# Patient Record
Sex: MALE | Race: WHITE | NOT HISPANIC OR LATINO | Employment: FULL TIME | URBAN - METROPOLITAN AREA
[De-identification: names, ages, dates, MRNs, and addresses within clinical notes are randomized per-mention and may not be internally consistent; named-entity substitution may affect disease eponyms.]

---

## 2018-06-30 ENCOUNTER — APPOINTMENT (EMERGENCY)
Dept: CT IMAGING | Facility: HOSPITAL | Age: 33
End: 2018-06-30
Payer: COMMERCIAL

## 2018-06-30 ENCOUNTER — HOSPITAL ENCOUNTER (EMERGENCY)
Facility: HOSPITAL | Age: 33
Discharge: HOME | End: 2018-06-30
Attending: EMERGENCY MEDICINE
Payer: COMMERCIAL

## 2018-06-30 VITALS
OXYGEN SATURATION: 100 % | WEIGHT: 184 LBS | TEMPERATURE: 98.9 F | SYSTOLIC BLOOD PRESSURE: 115 MMHG | RESPIRATION RATE: 16 BRPM | HEART RATE: 83 BPM | DIASTOLIC BLOOD PRESSURE: 51 MMHG

## 2018-06-30 DIAGNOSIS — R10.9 ABDOMINAL PAIN: Primary | ICD-10-CM

## 2018-06-30 DIAGNOSIS — R11.0 NAUSEA: ICD-10-CM

## 2018-06-30 LAB
ABO GROUP BLD: NORMAL
ALBUMIN SERPL BCP-MCNC: 4.6 G/DL (ref 3.5–5)
ALP SERPL-CCNC: 53 U/L (ref 46–116)
ALT SERPL W P-5'-P-CCNC: 37 U/L (ref 12–78)
ANION GAP SERPL CALCULATED.3IONS-SCNC: 11 MMOL/L (ref 4–13)
AST SERPL W P-5'-P-CCNC: 36 U/L (ref 5–45)
BASOPHILS # BLD AUTO: 0.02 THOUSANDS/ΜL (ref 0–0.1)
BASOPHILS NFR BLD AUTO: 0 % (ref 0–1)
BILIRUB SERPL-MCNC: 0.56 MG/DL (ref 0.2–1)
BLD GP AB SCN SERPL QL: NEGATIVE
BUN SERPL-MCNC: 19 MG/DL (ref 5–25)
CALCIUM SERPL-MCNC: 9 MG/DL (ref 8.3–10.1)
CHLORIDE SERPL-SCNC: 103 MMOL/L (ref 100–108)
CO2 SERPL-SCNC: 27 MMOL/L (ref 21–32)
CREAT SERPL-MCNC: 1.48 MG/DL (ref 0.6–1.3)
EOSINOPHIL # BLD AUTO: 0.03 THOUSAND/ΜL (ref 0–0.61)
EOSINOPHIL NFR BLD AUTO: 0 % (ref 0–6)
ERYTHROCYTE [DISTWIDTH] IN BLOOD BY AUTOMATED COUNT: 12.6 % (ref 11.6–15.1)
GFR SERPL CREATININE-BSD FRML MDRD: 61 ML/MIN/1.73SQ M
GLUCOSE SERPL-MCNC: 103 MG/DL (ref 65–140)
GLUCOSE SERPL-MCNC: 95 MG/DL (ref 65–140)
HCT VFR BLD AUTO: 46.2 % (ref 36.5–49.3)
HGB BLD-MCNC: 16.7 G/DL (ref 12–17)
LIPASE SERPL-CCNC: 144 U/L (ref 73–393)
LYMPHOCYTES # BLD AUTO: 1.65 THOUSANDS/ΜL (ref 0.6–4.47)
LYMPHOCYTES NFR BLD AUTO: 16 % (ref 14–44)
MCH RBC QN AUTO: 32.7 PG (ref 26.8–34.3)
MCHC RBC AUTO-ENTMCNC: 36.1 G/DL (ref 31.4–37.4)
MCV RBC AUTO: 91 FL (ref 82–98)
MONOCYTES # BLD AUTO: 0.73 THOUSAND/ΜL (ref 0.17–1.22)
MONOCYTES NFR BLD AUTO: 7 % (ref 4–12)
NEUTROPHILS # BLD AUTO: 7.93 THOUSANDS/ΜL (ref 1.85–7.62)
NEUTS SEG NFR BLD AUTO: 77 % (ref 43–75)
NRBC BLD AUTO-RTO: 0 /100 WBCS
PLATELET # BLD AUTO: 203 THOUSANDS/UL (ref 149–390)
PMV BLD AUTO: 11.1 FL (ref 8.9–12.7)
POTASSIUM SERPL-SCNC: 4 MMOL/L (ref 3.5–5.3)
PROT SERPL-MCNC: 7.5 G/DL (ref 6.4–8.2)
RBC # BLD AUTO: 5.1 MILLION/UL (ref 3.88–5.62)
RH BLD: POSITIVE
SODIUM SERPL-SCNC: 141 MMOL/L (ref 136–145)
SPECIMEN EXPIRATION DATE: NORMAL
WBC # BLD AUTO: 10.36 THOUSAND/UL (ref 4.31–10.16)

## 2018-06-30 PROCEDURE — 82948 REAGENT STRIP/BLOOD GLUCOSE: CPT

## 2018-06-30 PROCEDURE — 86901 BLOOD TYPING SEROLOGIC RH(D): CPT | Performed by: EMERGENCY MEDICINE

## 2018-06-30 PROCEDURE — 83690 ASSAY OF LIPASE: CPT | Performed by: EMERGENCY MEDICINE

## 2018-06-30 PROCEDURE — 96375 TX/PRO/DX INJ NEW DRUG ADDON: CPT

## 2018-06-30 PROCEDURE — 96374 THER/PROPH/DIAG INJ IV PUSH: CPT

## 2018-06-30 PROCEDURE — 74177 CT ABD & PELVIS W/CONTRAST: CPT

## 2018-06-30 PROCEDURE — 86850 RBC ANTIBODY SCREEN: CPT | Performed by: EMERGENCY MEDICINE

## 2018-06-30 PROCEDURE — 96361 HYDRATE IV INFUSION ADD-ON: CPT

## 2018-06-30 PROCEDURE — 86900 BLOOD TYPING SEROLOGIC ABO: CPT | Performed by: EMERGENCY MEDICINE

## 2018-06-30 PROCEDURE — 80053 COMPREHEN METABOLIC PANEL: CPT | Performed by: EMERGENCY MEDICINE

## 2018-06-30 PROCEDURE — 36415 COLL VENOUS BLD VENIPUNCTURE: CPT | Performed by: EMERGENCY MEDICINE

## 2018-06-30 PROCEDURE — 99284 EMERGENCY DEPT VISIT MOD MDM: CPT

## 2018-06-30 PROCEDURE — 85025 COMPLETE CBC W/AUTO DIFF WBC: CPT | Performed by: EMERGENCY MEDICINE

## 2018-06-30 RX ORDER — KETOROLAC TROMETHAMINE 30 MG/ML
15 INJECTION, SOLUTION INTRAMUSCULAR; INTRAVENOUS ONCE
Status: COMPLETED | OUTPATIENT
Start: 2018-06-30 | End: 2018-06-30

## 2018-06-30 RX ORDER — ONDANSETRON 2 MG/ML
4 INJECTION INTRAMUSCULAR; INTRAVENOUS ONCE
Status: COMPLETED | OUTPATIENT
Start: 2018-06-30 | End: 2018-06-30

## 2018-06-30 RX ORDER — METOCLOPRAMIDE 10 MG/1
10 TABLET ORAL EVERY 6 HOURS
Qty: 10 TABLET | Refills: 0 | Status: SHIPPED | OUTPATIENT
Start: 2018-06-30 | End: 2019-07-12 | Stop reason: ALTCHOICE

## 2018-06-30 RX ORDER — NAPROXEN 500 MG/1
500 TABLET ORAL 2 TIMES DAILY WITH MEALS
Qty: 10 TABLET | Refills: 0 | Status: SHIPPED | OUTPATIENT
Start: 2018-06-30 | End: 2019-07-12 | Stop reason: ALTCHOICE

## 2018-06-30 RX ADMIN — SODIUM CHLORIDE 1000 ML: 0.9 INJECTION, SOLUTION INTRAVENOUS at 17:50

## 2018-06-30 RX ADMIN — IOHEXOL 100 ML: 350 INJECTION, SOLUTION INTRAVENOUS at 18:35

## 2018-06-30 RX ADMIN — ONDANSETRON 4 MG: 2 INJECTION INTRAMUSCULAR; INTRAVENOUS at 17:49

## 2018-06-30 RX ADMIN — KETOROLAC TROMETHAMINE 15 MG: 30 INJECTION, SOLUTION INTRAMUSCULAR at 17:48

## 2018-06-30 NOTE — DISCHARGE INSTRUCTIONS

## 2018-06-30 NOTE — ED PROVIDER NOTES
History  Chief Complaint   Patient presents with    Abdominal Pain     Reports lower abd pain that started after jumping into a pool approx  30 min ago  Reports nausea  Reports worse with movement  Pt reports he ran a [employer] Race a few days ago and felt a soreness in his abdomen, "orestes like when you've done an ab workout "  That went away  Today, he dove into a pool and immediatly felt pain in his RLQ  Radiates across abdomen  He dove in again and felt the same pain  History provided by:  Patient   used: No    Abdominal Pain   Pain location:  RLQ  Pain quality: sharp    Pain radiation: Across abdomen  Duration:  2 hours  Timing:  Constant  Progression:  Waxing and waning  Chronicity:  New  Associated symptoms: nausea    Associated symptoms: no chills, no constipation, no cough, no diarrhea, no dysuria, no fever, no hematuria, no sore throat and no vomiting    Risk factors: has not had multiple surgeries        None       History reviewed  No pertinent past medical history  Past Surgical History:   Procedure Laterality Date    SHOULDER SURGERY Right        History reviewed  No pertinent family history  I have reviewed and agree with the history as documented  Social History   Substance Use Topics    Smoking status: Never Smoker    Smokeless tobacco: Never Used    Alcohol use Yes      Comment: social        Review of Systems   Constitutional: Negative for chills and fever  HENT: Negative for rhinorrhea and sore throat  Respiratory: Negative for cough  Gastrointestinal: Positive for abdominal pain and nausea  Negative for abdominal distention, anal bleeding, blood in stool, constipation, diarrhea, rectal pain and vomiting  Genitourinary: Negative for dysuria, flank pain, frequency, hematuria and urgency  Musculoskeletal: Negative for back pain  Skin: Negative for pallor and rash  All other systems reviewed and are negative        Physical Exam  Physical Exam   Constitutional: He is oriented to person, place, and time  He appears well-developed and well-nourished  No distress  HENT:   Head: Normocephalic  Mouth/Throat: Oropharynx is clear and moist and mucous membranes are normal    Neck: Normal range of motion  Neck supple  Cardiovascular: Normal rate, regular rhythm, normal heart sounds and intact distal pulses  Exam reveals no gallop and no friction rub  No murmur heard  Pulmonary/Chest: Effort normal and breath sounds normal  No respiratory distress  He has no wheezes  He has no rales  Abdominal: Soft  Normal appearance and bowel sounds are normal  He exhibits no distension and no mass  There is no hepatosplenomegaly  There is tenderness in the right lower quadrant  There is tenderness at McBurney's point  There is no rigidity, no rebound, no guarding, no CVA tenderness and negative Choudhary's sign  Lymphadenopathy:     He has no cervical adenopathy  Neurological: He is alert and oriented to person, place, and time  Skin: Skin is warm, dry and intact  No rash noted  No pallor  Nursing note and vitals reviewed        Vital Signs  ED Triage Vitals   Temperature Pulse Respirations Blood Pressure SpO2   06/30/18 1651 06/30/18 1651 06/30/18 1651 06/30/18 1651 06/30/18 1651   98 9 °F (37 2 °C) 100 18 138/80 98 %      Temp Source Heart Rate Source Patient Position - Orthostatic VS BP Location FiO2 (%)   06/30/18 1651 06/30/18 1745 06/30/18 1745 06/30/18 1745 --   Temporal Monitor Lying Right arm       Pain Score       06/30/18 1651       Worst Possible Pain           Vitals:    06/30/18 1651 06/30/18 1745 06/30/18 1857   BP: 138/80 167/77 115/51   Pulse: 100 83 83   Patient Position - Orthostatic VS:  Lying Sitting       Visual Acuity      ED Medications  Medications   sodium chloride 0 9 % bolus 1,000 mL (0 mL Intravenous Stopped 6/30/18 1847)   ondansetron (ZOFRAN) injection 4 mg (4 mg Intravenous Given 6/30/18 1749)   ketorolac (TORADOL) injection 15 mg (15 mg Intravenous Given 6/30/18 1748)   iohexol (OMNIPAQUE) 350 MG/ML injection (MULTI-DOSE) 100 mL (100 mL Intravenous Given 6/30/18 1835)       Diagnostic Studies  Results Reviewed     Procedure Component Value Units Date/Time    Comprehensive metabolic panel [[id number]]  (Abnormal) Collected:  06/30/18 1747    Lab Status:  Final result Specimen:  Blood from Arm, Right Updated:  06/30/18 1816     Sodium 141 mmol/L      Potassium 4 0 mmol/L      Chloride 103 mmol/L      CO2 27 mmol/L      Anion Gap 11 mmol/L      BUN 19 mg/dL      Creatinine 1 48 (H) mg/dL      Glucose 95 mg/dL      Calcium 9 0 mg/dL      AST 36 U/L      ALT 37 U/L      Alkaline Phosphatase 53 U/L      Total Protein 7 5 g/dL      Albumin 4 6 g/dL      Total Bilirubin 0 56 mg/dL      eGFR 61 ml/min/1 73sq m     Narrative:         National Kidney Disease Education Program recommendations are as follows:  GFR calculation is accurate only with a steady state creatinine  Chronic Kidney disease less than 60 ml/min/1 73 sq  meters  Kidney failure less than 15 ml/min/1 73 sq  meters      Lipase [[id number]]  (Normal) Collected:  06/30/18 1747    Lab Status:  Final result Specimen:  Blood from Arm, Right Updated:  06/30/18 1816     Lipase 144 u/L     CBC and differential [[id number]]  (Abnormal) Collected:  06/30/18 1747    Lab Status:  Final result Specimen:  Blood from Arm, Left Updated:  06/30/18 1808     WBC 10 36 (H) Thousand/uL      RBC 5 10 Million/uL      Hemoglobin 16 7 g/dL      Hematocrit 46 2 %      MCV 91 fL      MCH 32 7 pg      MCHC 36 1 g/dL      RDW 12 6 %      MPV 11 1 fL      Platelets 241 Thousands/uL      nRBC 0 /100 WBCs      Neutrophils Relative 77 (H) %      Lymphocytes Relative 16 %      Monocytes Relative 7 %      Eosinophils Relative 0 %      Basophils Relative 0 %      Neutrophils Absolute 7 93 (H) Thousands/µL      Lymphocytes Absolute 1 65 Thousands/µL      Monocytes Absolute 0 73 Thousand/µL      Eosinophils Absolute 0 03 Thousand/µL      Basophils Absolute 0 02 Thousands/µL     Fingerstick Glucose (POCT) [[id number]]  (Normal) Collected:  06/30/18 1751    Lab Status:  Final result Updated:  06/30/18 1755     POC Glucose 103 mg/dl                  CT abdomen pelvis with contrast   Final Result by Lisseth Frederick MD (06/30 1853)         1  Normal appendix   2  No acute traumatic injury detected            Workstation performed: [id number]                    Procedures  Procedures       Phone Contacts  ED Phone Contact    ED Course  ED Course as of Jun 30 2016   Sat Jun 30, 2018   1747 I heard the nurse screaming for me from pt's room  Pt is unresponsive in the room, but has good pulses  He is diaphoretic  Pt suddenly "snapped out of it" and says "hello," is alert and oriented  Nurse now tells me that she was drawing his blood when this happened  Likely pt had vasovagal event  Will check accucheck  1751 POC Glucose: 103   1912 Pt updated on labs/results  MDM  Number of Diagnoses or Management Options  Diagnosis management comments: RLQ pain - Will check CBC as marker of infection, CMP to r/o hepatitis/biliary disease, lipase to r/o pancreatitis, CT A/P to r/o appendicitis/blunt trauma  Pt states he does not want narcotic pain meds         Amount and/or Complexity of Data Reviewed  Clinical lab tests: ordered and reviewed  Tests in the radiology section of CPT®: ordered and reviewed  Tests in the medicine section of CPT®: reviewed and ordered      CritCare Time    Disposition  Final diagnoses:   Abdominal pain   Nausea     Time reflects when diagnosis was documented in both MDM as applicable and the Disposition within this note     Time User Action Codes Description Comment    6/30/2018  7:13 PM Everton Powell 47 [R10 9] Abdominal pain     6/30/2018  7:13 PM Everton Powell 47 [R11 0] Nausea       ED Disposition     ED Disposition Condition Comment    Discharge  Seth Harris discharge to home/self care  Condition at discharge: Good        Follow-up Information    None         Discharge Medication List as of 6/30/2018  7:14 PM      START taking these medications    Details   metoclopramide (REGLAN) 10 mg tablet Take 1 tablet (10 mg total) by mouth every 6 (six) hours, Starting Sat 6/30/2018, Print      naproxen (NAPROSYN) 500 mg tablet Take 1 tablet (500 mg total) by mouth 2 (two) times a day with meals, Starting Sat 6/30/2018, Print           No discharge procedures on file      ED Provider  Electronically Signed by           Chantell Aguirre 23, DO  06/30/18 2017

## 2018-06-30 NOTE — ED NOTES
Pt A&Ox4 states pain has gotten better, c/o feeling hungry states has not eaten much today        Vonda Ponce RN  06/30/18 1929

## 2018-06-30 NOTE — ED NOTES
While placing IV pt became diaphoretic and unresponsive for about 5-6 seconds Dr Alona Burrows made aware and is at bedside        Rodrigo Campoverde RN  06/30/18 9623

## 2019-07-12 ENCOUNTER — HOSPITAL ENCOUNTER (EMERGENCY)
Facility: HOSPITAL | Age: 34
Discharge: HOME | End: 2019-07-12
Attending: EMERGENCY MEDICINE | Admitting: EMERGENCY MEDICINE
Payer: COMMERCIAL

## 2019-07-12 VITALS
TEMPERATURE: 98.4 F | RESPIRATION RATE: 16 BRPM | WEIGHT: 202.82 LBS | HEIGHT: 72 IN | BODY MASS INDEX: 27.47 KG/M2 | OXYGEN SATURATION: 98 % | HEART RATE: 81 BPM | SYSTOLIC BLOOD PRESSURE: 131 MMHG | DIASTOLIC BLOOD PRESSURE: 89 MMHG

## 2019-07-12 DIAGNOSIS — R55 SYNCOPE: Primary | ICD-10-CM

## 2019-07-12 LAB
ANION GAP BLD CALC-SCNC: 19 MMOL/L (ref 4–13)
BUN BLD-MCNC: 23 MG/DL (ref 5–25)
CA-I BLD-SCNC: 1.15 MMOL/L (ref 1.12–1.32)
CHLORIDE BLD-SCNC: 102 MMOL/L (ref 100–108)
CREAT BLD-MCNC: 1.4 MG/DL (ref 0.6–1.3)
GFR SERPL CREATININE-BSD FRML MDRD: 65 ML/MIN/1.73SQ M
GLUCOSE SERPL-MCNC: 110 MG/DL (ref 65–140)
HCT VFR BLD CALC: 46 % (ref 36.5–49.3)
HGB BLDA-MCNC: 15.6 G/DL (ref 12–17)
PCO2 BLD: 27 MMOL/L (ref 21–32)
POTASSIUM BLD-SCNC: 4 MMOL/L (ref 3.5–5.3)
SODIUM BLD-SCNC: 143 MMOL/L (ref 136–145)
SPECIMEN SOURCE: ABNORMAL

## 2019-07-12 PROCEDURE — 85014 HEMATOCRIT: CPT

## 2019-07-12 PROCEDURE — 99284 EMERGENCY DEPT VISIT MOD MDM: CPT

## 2019-07-12 PROCEDURE — 93005 ELECTROCARDIOGRAM TRACING: CPT

## 2019-07-12 PROCEDURE — 99283 EMERGENCY DEPT VISIT LOW MDM: CPT | Performed by: EMERGENCY MEDICINE

## 2019-07-12 PROCEDURE — 80047 BASIC METABLC PNL IONIZED CA: CPT

## 2019-07-13 LAB
ATRIAL RATE: 85 BPM
P AXIS: 69 DEGREES
PR INTERVAL: 132 MS
QRS AXIS: 80 DEGREES
QRSD INTERVAL: 104 MS
QT INTERVAL: 362 MS
QTC INTERVAL: 430 MS
T WAVE AXIS: 13 DEGREES
VENTRICULAR RATE: 85 BPM

## 2019-07-13 PROCEDURE — 93010 ELECTROCARDIOGRAM REPORT: CPT | Performed by: INTERNAL MEDICINE

## 2019-07-13 NOTE — ED PROCEDURE NOTE
PROCEDURE  ECG 12 Lead Documentation Only  Date/Time: 7/12/2019 10:03 PM  Performed by: Vinayak Hardy DO  Authorized by: Vinayak Hardy DO     Indications / Diagnosis:  Syncope  ECG reviewed by me, the ED Provider: yes    Patient location:  ED  Interpretation:     Interpretation: normal    Rate:     ECG rate assessment: normal    Rhythm:     Rhythm: sinus rhythm    Ectopy:     Ectopy: none    QRS:     QRS axis:  Normal  Conduction:     Conduction: normal    ST segments:     ST segments:  Normal  T waves:     T waves: normal           Vinayak Hardy DO  07/12/19 2207

## 2019-07-13 NOTE — ED PROVIDER NOTES
History  Chief Complaint   Patient presents with    Syncope     pt states he was at a restaurant, had a feeling of CP/large burp, and then had a syncopal event during which he was unconscious for about 2 minutes  Pt regained consciousness independently and refused transport by EMS  Pt denies pain currently      25-year-old male with no past medical history presents to the emergency department for evaluation of syncopal event  Patient states he was checking out the course for a [employer] race and did 1 obstacle  He states after the obstacle he felt perfectly normal   He went upstairs to have dinner  He states he had 2 beers with dinner  He felt like he was going to [employer] and had intense pain across his entire chest   He states the next thing he remembers or people around him  He thinks he might have been out for about 2 minutes  No loss of bowel or bladder  No prior history of syncopal episodes  He currently feels completely back to normal   His only complaint is pain across the lateral aspect of his left foot  History provided by:  Patient   used: No    Syncope   Episode history:  Single  Most recent episode: Today  Duration:  2 minutes  Progression:  Resolved  Chronicity:  New  Context: sitting down    Witnessed: yes    Associated symptoms: no anxiety, no chest pain, no confusion, no diaphoresis, no difficulty breathing, no dizziness, no fever, no focal sensory loss, no focal weakness, no headaches, no malaise/fatigue, no nausea, no palpitations, no recent fall, no recent injury, no recent surgery, no rectal bleeding, no seizures, no shortness of breath, no visual change, no vomiting and no weakness    Risk factors: no congenital heart disease, no coronary artery disease, no seizures and no vascular disease        None       History reviewed  No pertinent past medical history      Past Surgical History:   Procedure Laterality Date    GANGLION CYST EXCISION      SHOULDER SURGERY Right        History reviewed  No pertinent family history  I have reviewed and agree with the history as documented  Social History     Tobacco Use    Smoking status: Never Smoker    Smokeless tobacco: Never Used   Substance Use Topics    Alcohol use: Yes     Comment: social   had 2 beers 7/12/19    Drug use: No        Review of Systems   Constitutional: Negative  Negative for diaphoresis, fever and malaise/fatigue  HENT: Negative  Eyes: Negative  Respiratory: Negative  Negative for shortness of breath  Cardiovascular: Positive for syncope  Negative for chest pain and palpitations  Gastrointestinal: Negative  Negative for nausea and vomiting  Genitourinary: Negative  Musculoskeletal: Negative for neck pain  Skin: Negative  Allergic/Immunologic: Negative  Neurological: Negative for dizziness, focal weakness, seizures, weakness, numbness and headaches  Hematological: Negative  Psychiatric/Behavioral: Negative  Negative for confusion  All other systems reviewed and are negative  Physical Exam  Physical Exam   Constitutional: He is oriented to person, place, and time  He appears well-developed and well-nourished  Non-toxic appearance  He does not have a sickly appearance  He does not appear ill  No distress  HENT:   Head: Normocephalic and atraumatic  Right Ear: External ear normal    Left Ear: External ear normal    Mouth/Throat: Oropharynx is clear and moist    Eyes: Pupils are equal, round, and reactive to light  Conjunctivae and EOM are normal  No scleral icterus  Neck: Normal range of motion  Neck supple  No spinous process tenderness present  Cardiovascular: Normal rate, regular rhythm and normal heart sounds  Pulmonary/Chest: Effort normal and breath sounds normal    Abdominal: Soft  Bowel sounds are normal  He exhibits no distension and no mass  There is no tenderness  No hernia  Musculoskeletal: Normal range of motion   He exhibits no edema or deformity  Left foot: There is tenderness  There is normal range of motion, no swelling, no crepitus, no deformity and no laceration  Feet:    Lymphadenopathy:     He has no cervical adenopathy  Neurological: He is alert and oriented to person, place, and time  He has normal strength and normal reflexes  He displays normal reflexes  He exhibits normal muscle tone  Skin: Skin is warm and dry  No rash noted  He is not diaphoretic  No erythema  No pallor  Psychiatric: He has a normal mood and affect  Nursing note and vitals reviewed        Vital Signs  ED Triage Vitals [07/12/19 2136]   Temperature Pulse Respirations Blood Pressure SpO2   98 4 °F (36 9 °C) 81 16 131/89 98 %      Temp Source Heart Rate Source Patient Position - Orthostatic VS BP Location FiO2 (%)   Temporal Monitor Sitting Right arm --      Pain Score       No Pain           Vitals:    07/12/19 2136   BP: 131/89   Pulse: 81   Patient Position - Orthostatic VS: Sitting         Visual Acuity      ED Medications  Medications - No data to display    Diagnostic Studies  Results Reviewed     Procedure Component Value Units Date/Time    POCT Chem 8+ [[id number]]  (Abnormal) Collected:  07/12/19 2204    Lab Status:  Final result Specimen:  Venous Updated:  07/12/19 2208     SODIUM, I-STAT 143 mmol/l      Potassium, i-STAT 4 0 mmol/L      Chloride, istat 102 mmol/L      CO2, i-STAT 27 mmol/L      Anion Gap, i-STAT 19 mmol/L      Calcium, Ionized i-STAT 1 15 mmol/L      BUN, I-STAT 23 mg/dl      Creatinine, i-STAT 1 4 mg/dl      eGFR 65 ml/min/1 73sq m      Glucose, i-STAT 110 mg/dl      Hct, i-STAT 46 %      Hgb, i-STAT 15 6 g/dl      Specimen Type VENOUS    Narrative:       National Kidney Disease Foundation guidelines for Chronic Kidney Disease (CKD):     Stage 1 with normal or high GFR (GFR > 90 mL/min/1 73 square meters)    Stage 2 Mild CKD (GFR = 60-89 mL/min/1 73 square meters)    Stage 3A Moderate CKD (GFR = 45-59 mL/min/1 73 square meters)    Stage 3B Moderate CKD (GFR = 30-44 mL/min/1 73 square meters)    Stage 4 Severe CKD (GFR = 15-29 mL/min/1 73 square meters)    Stage 5 End Stage CKD (GFR <15 mL/min/1 73 square meters)  Note: GFR calculation is accurate only with a steady state creatinine                 No orders to display              Procedures  Procedures       ED Course                               MDM  Number of Diagnoses or Management Options  Diagnosis management comments: 71-year-old male presents for evaluation after syncopal event  Patient states he was eating dinner and felt a belch coming on  This caused intense pain across his chest and then the next thing he remembers is waking up on the floor with people around him  He now is currently completely asymptomatic except for some mild pain over the lateral aspect of his left foot  His exam here is normal   The exam of his left foot is also normal and I do not feel there is any fracture  Will do EKG and check basic chemistry to rule out electrolyte abnormality  At this time I feel patient most likely had a episode of vasovagal syncope secondary to pain       Amount and/or Complexity of Data Reviewed  Clinical lab tests: ordered and reviewed  Independent visualization of images, tracings, or specimens: yes        Disposition  Final diagnoses:   Syncope     Time reflects when diagnosis was documented in both MDM as applicable and the Disposition within this note     Time User Action Codes Description Comment    7/12/2019 10:37 PM Lisa Sotelo Add [R55] Syncope       ED Disposition     ED Disposition Condition Date/Time Comment    Discharge Good Fri Jul 12, 2019 10:37 PM Karla Shin discharge to home/self care              Follow-up Information     Follow up With Specialties Details Why Jose G Jay MD Family Medicine Schedule an appointment as soon as possible for a visit in 3 days  [place] [id number]  [phone]            Patient's Medications   Discharge Prescriptions    No medications on file     No discharge procedures on file      ED Provider  Electronically Signed by           Jenelle Barahona DO  07/12/19 6997

## 2024-12-10 NOTE — PROGRESS NOTES
12/11/2024      Chief Complaint   Patient presents with    Consult     Vasectomy        Assessment and Plan    38 y.o. male managed by new patient    1. Desire for elective sterilization  - exam today as below  - informed consent signed today  - continue/ensure continued contraception until sterilization confirmed below  - rx xanax 1mg with  to/from on appt date  - shave all penile/scrotal/pubic hair day prior to appt date  - need for post-vasectomy semen analysis at 8 weeks after procedure to confirm sterility    Return for vasectomy in office.      History of Present Illness  Aime Caceres is a 38 y.o. male here for evaluation of VASECTOMY CONSULT    History of genitourinary or groin trauma or surgery- no  Fathered children- 1  Current contraceptive method- female birth control  Work/manual labor/lifting-    Voiding issues- none  Bleeding issues/thinners- none  Allergies to lidocaine/marcaine/betadine/chromic- none    The patient presents requesting elective sterilization vasectomy.     We discussed that vasectomy is in operation performed in the office in order to provide elective sterilization. There are instances in which body habitus or changes to the genital tissue/anatomy would necessitate procedure done in a surgical suite/hospital operating room. Physical exam is performed today to assess the candidate specifically for this reason.    This procedure should be considered a permanent option. Although there are subspecialists who perform vasectomy reversals, these operations are not 100% successful and are often not covered by insurance meaning they can come with a large out-of-pocket cost. The patient understands this.     We reviewed the procedure in depth. Risk and benefits of the procedure were discussed and reviewed. Informed consent was obtained in the office today. The patient was prescribed a benzodiazepine to take one hour prior to the procedure to assist with his comfort.  He  understands that he will require transportation by a sober  to and from the office that day if he is to use the benzodiazepine.       He also understands he will require semen analysis testing at 8 weeks post procedure to ensure full sterilization.  In the interim, he will require contraception during intercourse to avoid an undesired pregnancy.     Usually, patients are out of work for 2-3 days. We recommend tight fitting scrotal support following the procedure along with ice packs applied to the scrotum 15 minutes on and 15 minutes off for the first 24-48 hours. We also discussed pain management after the procedure, which will be left up to the discretion of the surgeon who is performing the vasectomy that day.     After this discussion, the patient agrees to proceed. We will schedule him in the near future.    He agrees to take oral sedative - xanax 1mg one hour prior to procedure.      Review of Systems   Constitutional:  Negative for chills, diaphoresis, fatigue and fever.   Respiratory:  Negative for cough and shortness of breath.    Gastrointestinal:  Negative for abdominal pain, diarrhea, nausea and vomiting.   Genitourinary:  Negative for decreased urine volume, difficulty urinating, dysuria, flank pain, frequency, hematuria and urgency.   Musculoskeletal:  Negative for back pain and myalgias.   Skin:  Negative for pallor and wound.   Neurological:  Negative for dizziness, weakness, light-headedness and numbness.       AUA SYMPTOM SCORE      Flowsheet Row Most Recent Value   AUA SYMPTOM SCORE    How often have you had a sensation of not emptying your bladder completely after you finished urinating? 0 (P)     How often have you had to urinate again less than two hours after you finished urinating? 1 (P)     How often have you found you stopped and started again several times when you urinate? 1 (P)     How often have you found it difficult to postpone urination? 0 (P)     How often have you had a weak  "urinary stream? 0 (P)     How often have you had to push or strain to begin urination? 1 (P)     How many times did you most typically get up to urinate from the time you went to bed at night until the time you got up in the morning? 1 (P)     Quality of Life: If you were to spend the rest of your life with your urinary condition just the way it is now, how would you feel about that? 2 (P)     AUA SYMPTOM SCORE 4 (P)             Vitals  Vitals:    12/11/24 0739   BP: 118/76   BP Location: Left arm   Patient Position: Sitting   Cuff Size: Standard   Pulse: 88   SpO2: 96%   Weight: 102 kg (225 lb)   Height: 5' 11\" (1.803 m)       Physical Exam  Constitutional:       Appearance: Normal appearance.   HENT:      Head: Normocephalic and atraumatic.   Eyes:      Conjunctiva/sclera: Conjunctivae normal.   Pulmonary:      Effort: Pulmonary effort is normal.   Genitourinary:     Comments: Circumcised penis, normal phallus, orthotopic patent meatus. Testes smooth, descended bilaterally into the scrotum, nontender with no palpable mass. Palpably normal spermatic cord and vas deferens bilaterally.    Musculoskeletal:         General: Normal range of motion.      Cervical back: Normal range of motion.   Skin:     General: Skin is warm and dry.   Neurological:      General: No focal deficit present.      Mental Status: He is alert and oriented to person, place, and time.   Psychiatric:         Mood and Affect: Mood normal.         Behavior: Behavior normal.         Thought Content: Thought content normal.         Judgment: Judgment normal.       Past History  History reviewed. No pertinent past medical history.  Social History     Socioeconomic History    Marital status: /Civil Union     Spouse name: None    Number of children: None    Years of education: None    Highest education level: None   Occupational History    None   Tobacco Use    Smoking status: Never    Smokeless tobacco: Never   Vaping Use    Vaping status: " Never Used   Substance and Sexual Activity    Alcohol use: Yes     Comment: Social    Drug use: No    Sexual activity: None   Other Topics Concern    None   Social History Narrative    None     Social Drivers of Health     Financial Resource Strain: Not on file   Food Insecurity: Not on file   Transportation Needs: Not on file   Physical Activity: Not on file   Stress: Not on file   Social Connections: Not on file   Intimate Partner Violence: Not on file   Housing Stability: Not on file     Social History     Tobacco Use   Smoking Status Never   Smokeless Tobacco Never     Family History   Problem Relation Age of Onset    Heart disease Paternal Grandmother     Heart disease Paternal Grandfather     Atrial fibrillation Maternal Grandmother        The following portions of the patient's history were reviewed and updated as appropriate: allergies, current medications, past medical history, past social history, past surgical history and problem list.    Results  Lab Results   Component Value Date    GLUCOSE 110 07/12/2019    CALCIUM 9.2 10/25/2024    K 4.1 10/25/2024    CO2 30 10/25/2024     10/25/2024    BUN 18 10/25/2024    CREATININE 1.19 10/25/2024     Lab Results   Component Value Date    WBC 10.36 (H) 06/30/2018    HGB 15.6 07/12/2019    HCT 46 07/12/2019    MCV 91 06/30/2018     06/30/2018

## 2024-12-11 ENCOUNTER — OFFICE VISIT (OUTPATIENT)
Dept: UROLOGY | Facility: MEDICAL CENTER | Age: 39
End: 2024-12-11
Payer: COMMERCIAL

## 2024-12-11 VITALS
OXYGEN SATURATION: 96 % | HEART RATE: 88 BPM | BODY MASS INDEX: 31.5 KG/M2 | HEIGHT: 71 IN | WEIGHT: 225 LBS | DIASTOLIC BLOOD PRESSURE: 76 MMHG | SYSTOLIC BLOOD PRESSURE: 118 MMHG

## 2024-12-11 DIAGNOSIS — Z30.09 CONSULTATION FOR STERILIZATION: Primary | ICD-10-CM

## 2024-12-11 PROCEDURE — 99204 OFFICE O/P NEW MOD 45 MIN: CPT

## 2024-12-11 RX ORDER — ALPRAZOLAM 1 MG/1
1 TABLET ORAL AS NEEDED
Qty: 1 TABLET | Refills: 0 | Status: SHIPPED | OUTPATIENT
Start: 2024-12-11

## 2024-12-11 RX ORDER — IBUPROFEN 200 MG
TABLET ORAL AS NEEDED
COMMUNITY

## 2025-01-30 ENCOUNTER — PROCEDURE VISIT (OUTPATIENT)
Dept: UROLOGY | Facility: MEDICAL CENTER | Age: 40
End: 2025-01-30
Payer: COMMERCIAL

## 2025-01-30 VITALS
WEIGHT: 219 LBS | HEART RATE: 101 BPM | BODY MASS INDEX: 30.66 KG/M2 | DIASTOLIC BLOOD PRESSURE: 76 MMHG | OXYGEN SATURATION: 99 % | HEIGHT: 71 IN | SYSTOLIC BLOOD PRESSURE: 120 MMHG

## 2025-01-30 DIAGNOSIS — Z30.2 ENCOUNTER FOR STERILIZATION IN MALE: Primary | ICD-10-CM

## 2025-01-30 DIAGNOSIS — Z98.52 VASECTOMY STATUS: ICD-10-CM

## 2025-01-30 PROCEDURE — 55250 REMOVAL OF SPERM DUCT(S): CPT | Performed by: UROLOGY

## 2025-01-30 PROCEDURE — 88302 TISSUE EXAM BY PATHOLOGIST: CPT | Performed by: STUDENT IN AN ORGANIZED HEALTH CARE EDUCATION/TRAINING PROGRAM

## 2025-01-30 NOTE — PROGRESS NOTES
"    Vasectomy     Date/Time  1/30/2025 8:30 AM     Performed by  Adriel Coffey MD   Authorized by  Adriel Coffey MD     [facility] Protocol   Consent: Verbal consent obtained. Written consent obtained.  Risks and benefits: risks, benefits and alternatives were discussed  Consent given by: patient  Time out: Immediately prior to procedure a \"time out\" was called to verify the correct patient, procedure, equipment, support staff and site/side marked as required.  Patient understanding: patient states understanding of the procedure being performed  Patient consent: the patient's understanding of the procedure matches consent given  Procedure consent: procedure consent matches procedure scheduled  Patient identity confirmed: verbally with patient      Local anesthesia used: yes      Anesthesia: local infiltration     Anesthesia   Local anesthesia used: yes  Local Anesthetic: lidocaine 2% without epinephrine and bupivacaine 0.5% without epinephrine  Anesthetic total: 10 mL     Sedation   Patient sedated: no        Specimen: yes    Culture: no   Procedure Details   Procedure Notes: The patient was brought to the procedure room and placed supine on the operating table.  He was identified and any questions answered.  He was then prepped and draped in the usual sterile fashion. The right vas was identified grasped and brought up to the midline. Local anesthesia was then administered. A small incision was made in the median raphae and dissected down to cindy vasal tissue.  More local anesthesia was administered. The vas was grasped in a vas clamp and externalized.  The cindy vasal tissue was cleared and a segment of vas excised.  The proximal and distal lumens were then cauterized for a distance of approximately 1 cm.  Cindy vasal tissue was then interposed between the 2 vas ends using a clip.  Hemostasis was then confirmed and felt to be adequate.  The right vas was dropped down into the scrotum.  Next the left vas was " identified and brought up to the incision.  More local anesthesia was administered. The left vas was grasped in the vas clamp.  The cindy vasal tissue was cleared and a segment of vas excised.  The lumens were then cauterized for a distance of 1 cm.  The cindy vasal tissue was then interposed between the 2 ends of the vas using a clip.  Hemostasis was achieved and reconfirmed.  The left vas was then dropped down into the incision.  A 3 0 chromic suture was then used in a subcuticular fashion to reapproximate the dartos muscle.  Hemostasis was adequate.  A sterile pressure dressing was then applied.  The patient tolerated the procedure well.  Blood loss was minimal.  The segments of vas were sent to pathology for analysis.  The patient got up from the table and dressed.  Discharge instructions were then given to the patient as per our discharge instruction sheet.  He left the procedure room in satisfactory condition.    Patient Transportation: confirmed  Patient tolerance: patient tolerated the procedure well with no immediate complications

## 2025-01-30 NOTE — Clinical Note
"January 30, 2025     Referral Self    Patient: Aime Caceres   YOB: 1985   Date of Visit: 1/30/2025       Dear  Self:    Thank you for referring Aime Caceres to me for evaluation. Below are my notes for this consultation.    If you have questions, please do not hesitate to call me. I look forward to following your patient along with you.         Sincerely,        Adriel Coffey MD        CC: No Recipients  Adriel Coffey MD  1/30/2025  9:21 AM  Sign when Signing Visit      Vasectomy     Date/Time  1/30/2025 8:30 AM     Performed by  Adriel Coffey MD   Authorized by  Adriel Coffey MD     [facility] Protocol   Consent: Verbal consent obtained. Written consent obtained.  Risks and benefits: risks, benefits and alternatives were discussed  Consent given by: patient  Time out: Immediately prior to procedure a \"time out\" was called to verify the correct patient, procedure, equipment, support staff and site/side marked as required.  Patient understanding: patient states understanding of the procedure being performed  Patient consent: the patient's understanding of the procedure matches consent given  Procedure consent: procedure consent matches procedure scheduled  Patient identity confirmed: verbally with patient      Local anesthesia used: yes      Anesthesia: local infiltration     Anesthesia   Local anesthesia used: yes  Local Anesthetic: lidocaine 2% without epinephrine and bupivacaine 0.5% without epinephrine  Anesthetic total: 10 mL     Sedation   Patient sedated: no        Specimen: yes    Culture: no   Procedure Details   Procedure Notes: The patient was brought to the procedure room and placed supine on the operating table.  He was identified and any questions answered.  He was then prepped and draped in the usual sterile fashion. The right vas was identified grasped and brought up to the midline. Local anesthesia was then administered. A small incision was made in the median raphae and " dissected down to cindy vasal tissue.  More local anesthesia was administered. The vas was grasped in a vas clamp and externalized.  The cindy vasal tissue was cleared and a segment of vas excised.  The proximal and distal lumens were then cauterized for a distance of approximately 1 cm.  Cindy vasal tissue was then interposed between the 2 vas ends using a clip.  Hemostasis was then confirmed and felt to be adequate.  The right vas was dropped down into the scrotum.  Next the left vas was identified and brought up to the incision.  More local anesthesia was administered. The left vas was grasped in the vas clamp.  The cindy vasal tissue was cleared and a segment of vas excised.  The lumens were then cauterized for a distance of 1 cm.  The cindy vasal tissue was then interposed between the 2 ends of the vas using a clip.  Hemostasis was achieved and reconfirmed.  The left vas was then dropped down into the incision.  A 3 0 chromic suture was then used in a subcuticular fashion to reapproximate the dartos muscle.  Hemostasis was adequate.  A sterile pressure dressing was then applied.  The patient tolerated the procedure well.  Blood loss was minimal.  The segments of vas were sent to pathology for analysis.  The patient got up from the table and dressed.  Discharge instructions were then given to the patient as per our discharge instruction sheet.  He left the procedure room in satisfactory condition.    Patient Transportation: confirmed  Patient tolerance: patient tolerated the procedure well with no immediate complications

## 2025-02-04 PROCEDURE — 88302 TISSUE EXAM BY PATHOLOGIST: CPT | Performed by: STUDENT IN AN ORGANIZED HEALTH CARE EDUCATION/TRAINING PROGRAM

## 2025-05-02 ENCOUNTER — APPOINTMENT (OUTPATIENT)
Dept: LAB | Facility: HOSPITAL | Age: 40
End: 2025-05-02
Attending: UROLOGY
Payer: COMMERCIAL

## 2025-05-02 DIAGNOSIS — Z98.52 VASECTOMY STATUS: ICD-10-CM

## 2025-05-02 LAB
DEPRECATED CD4 CELLS/CD8 CELLS BLD: 0.5 ML
SPERM MOTILE SMN QL MICRO: NORMAL

## 2025-05-02 PROCEDURE — 89321 SEMEN ANAL SPERM DETECTION: CPT

## 2025-05-05 ENCOUNTER — RESULTS FOLLOW-UP (OUTPATIENT)
Dept: UROLOGY | Facility: MEDICAL CENTER | Age: 40
End: 2025-05-05